# Patient Record
Sex: MALE | URBAN - METROPOLITAN AREA
[De-identification: names, ages, dates, MRNs, and addresses within clinical notes are randomized per-mention and may not be internally consistent; named-entity substitution may affect disease eponyms.]

---

## 2017-06-01 ENCOUNTER — EMERGENCY (EMERGENCY)
Facility: HOSPITAL | Age: 62
LOS: 1 days | Discharge: HOME | End: 2017-06-01
Attending: EMERGENCY MEDICINE | Admitting: EMERGENCY MEDICINE
Payer: MEDICAID

## 2017-06-01 VITALS
TEMPERATURE: 98 F | RESPIRATION RATE: 17 BRPM | SYSTOLIC BLOOD PRESSURE: 167 MMHG | HEART RATE: 58 BPM | OXYGEN SATURATION: 100 % | DIASTOLIC BLOOD PRESSURE: 81 MMHG

## 2017-06-01 VITALS
HEART RATE: 48 BPM | RESPIRATION RATE: 18 BRPM | OXYGEN SATURATION: 100 % | SYSTOLIC BLOOD PRESSURE: 155 MMHG | DIASTOLIC BLOOD PRESSURE: 80 MMHG

## 2017-06-01 DIAGNOSIS — Z85.46 PERSONAL HISTORY OF MALIGNANT NEOPLASM OF PROSTATE: Chronic | ICD-10-CM

## 2017-06-01 LAB
ALBUMIN SERPL ELPH-MCNC: 4.3 G/DL — SIGNIFICANT CHANGE UP (ref 3.3–5)
ALP SERPL-CCNC: 51 U/L — SIGNIFICANT CHANGE UP (ref 40–120)
ALT FLD-CCNC: 24 U/L RC — SIGNIFICANT CHANGE UP (ref 10–45)
ANION GAP SERPL CALC-SCNC: 9 MMOL/L — SIGNIFICANT CHANGE UP (ref 5–17)
AST SERPL-CCNC: 23 U/L — SIGNIFICANT CHANGE UP (ref 10–40)
BILIRUB SERPL-MCNC: 0.6 MG/DL — SIGNIFICANT CHANGE UP (ref 0.2–1.2)
BUN SERPL-MCNC: 18 MG/DL — SIGNIFICANT CHANGE UP (ref 7–23)
CALCIUM SERPL-MCNC: 9.6 MG/DL — SIGNIFICANT CHANGE UP (ref 8.4–10.5)
CHLORIDE SERPL-SCNC: 105 MMOL/L — SIGNIFICANT CHANGE UP (ref 96–108)
CO2 SERPL-SCNC: 27 MMOL/L — SIGNIFICANT CHANGE UP (ref 22–31)
CREAT SERPL-MCNC: 0.94 MG/DL — SIGNIFICANT CHANGE UP (ref 0.5–1.3)
CRP SERPL-MCNC: 0.2 MG/DL — SIGNIFICANT CHANGE UP (ref 0–0.4)
ERYTHROCYTE [SEDIMENTATION RATE] IN BLOOD: 10 MM/HR — SIGNIFICANT CHANGE UP (ref 0–20)
GLUCOSE SERPL-MCNC: 102 MG/DL — HIGH (ref 70–99)
HCT VFR BLD CALC: 39.8 % — SIGNIFICANT CHANGE UP (ref 39–50)
HGB BLD-MCNC: 13.3 G/DL — SIGNIFICANT CHANGE UP (ref 13–17)
MCHC RBC-ENTMCNC: 32.3 PG — SIGNIFICANT CHANGE UP (ref 27–34)
MCHC RBC-ENTMCNC: 33.4 GM/DL — SIGNIFICANT CHANGE UP (ref 32–36)
MCV RBC AUTO: 96.7 FL — SIGNIFICANT CHANGE UP (ref 80–100)
PLATELET # BLD AUTO: 196 K/UL — SIGNIFICANT CHANGE UP (ref 150–400)
POTASSIUM SERPL-MCNC: 4.9 MMOL/L — SIGNIFICANT CHANGE UP (ref 3.5–5.3)
POTASSIUM SERPL-SCNC: 4.9 MMOL/L — SIGNIFICANT CHANGE UP (ref 3.5–5.3)
PROT SERPL-MCNC: 7.2 G/DL — SIGNIFICANT CHANGE UP (ref 6–8.3)
RBC # BLD: 4.12 M/UL — LOW (ref 4.2–5.8)
RBC # FLD: 11.7 % — SIGNIFICANT CHANGE UP (ref 10.3–14.5)
SODIUM SERPL-SCNC: 141 MMOL/L — SIGNIFICANT CHANGE UP (ref 135–145)
WBC # BLD: 5.8 K/UL — SIGNIFICANT CHANGE UP (ref 3.8–10.5)
WBC # FLD AUTO: 5.8 K/UL — SIGNIFICANT CHANGE UP (ref 3.8–10.5)

## 2017-06-01 PROCEDURE — 99284 EMERGENCY DEPT VISIT MOD MDM: CPT

## 2017-06-01 PROCEDURE — 85652 RBC SED RATE AUTOMATED: CPT

## 2017-06-01 PROCEDURE — 86140 C-REACTIVE PROTEIN: CPT

## 2017-06-01 PROCEDURE — 85027 COMPLETE CBC AUTOMATED: CPT

## 2017-06-01 PROCEDURE — 73564 X-RAY EXAM KNEE 4 OR MORE: CPT

## 2017-06-01 PROCEDURE — 73564 X-RAY EXAM KNEE 4 OR MORE: CPT | Mod: 26,LT

## 2017-06-01 PROCEDURE — 80053 COMPREHEN METABOLIC PANEL: CPT

## 2017-06-01 PROCEDURE — 99283 EMERGENCY DEPT VISIT LOW MDM: CPT | Mod: 25

## 2017-06-01 RX ORDER — OXYCODONE HYDROCHLORIDE 5 MG/1
1 TABLET ORAL
Qty: 9 | Refills: 0 | OUTPATIENT
Start: 2017-06-01 | End: 2017-06-04

## 2017-06-01 RX ORDER — OXYCODONE HYDROCHLORIDE 5 MG/1
5 TABLET ORAL ONCE
Qty: 0 | Refills: 0 | Status: DISCONTINUED | OUTPATIENT
Start: 2017-06-01 | End: 2017-06-01

## 2017-06-01 RX ORDER — ACETAMINOPHEN 500 MG
650 TABLET ORAL ONCE
Qty: 0 | Refills: 0 | Status: COMPLETED | OUTPATIENT
Start: 2017-06-01 | End: 2017-06-01

## 2017-06-01 RX ADMIN — OXYCODONE HYDROCHLORIDE 5 MILLIGRAM(S): 5 TABLET ORAL at 13:00

## 2017-06-01 RX ADMIN — Medication 650 MILLIGRAM(S): at 13:00

## 2017-06-01 RX ADMIN — OXYCODONE HYDROCHLORIDE 5 MILLIGRAM(S): 5 TABLET ORAL at 12:34

## 2017-06-01 RX ADMIN — Medication 650 MILLIGRAM(S): at 12:34

## 2017-06-01 NOTE — ED PROVIDER NOTE - CARE PLAN
Principal Discharge DX:	Chronic pain of left knee  Instructions for follow-up, activity and diet:	You were seen in the Emergency Department for knee pain. Your examination and lab tests were reassuring. Please follow up with orthopedic surgery as soon as possible for further evaluation. Please return to the Emergency Department if you have any new concerning symptoms such as severe pain, weakness, or any other concerning symptoms. Take tylenol and ibuprofen as needed for pain. Please return to the Emergency Department if you have any new concerning symptoms such as severe pain, weakness, or any other concerning symptoms.

## 2017-06-01 NOTE — ED ADULT NURSE NOTE - OBJECTIVE STATEMENT
[id number] 62 yr old BM c/o left knee pain x 3 months and on and off. Injury many yrs ago. diff walking today. No recent injury. +Swelling left knee. pain with ROM. ambulated in from waiting room. A&Ox3. Denies fever or chills

## 2017-06-01 NOTE — ED PROVIDER NOTE - OBJECTIVE STATEMENT
Private Physician [facility]  62y male pmh HTN, SP proctectomy for ca, No chemo/rt. habits,dm,hld, pt complains of pain left knee pain for past several months. Now diff ambulating. No trauma, No fever chills. sob/chest pain/palps.calf pain/swelling. Took naprosyn without relief. Now diff ambulating. No rash swelling. Pain worse with flexion, ambulation. Private Physician [facility]  62y male pmh HTN, SP proctectomy for ca, No chemo/rt. habits,dm,hld, pt complains of pain left knee pain for past several months. Now diff ambulating. No trauma, No fever chills. sob/chest pain/palps.calf pain/swelling. Took naprosyn without relief. Now diff ambulating. No rash swelling. Pain worse with flexion, ambulation. Pain is medial left knee, aggravated by movement, mildly alleviated by medications, pressure. Denies trauma.

## 2017-06-01 NOTE — ED PROVIDER NOTE - PLAN OF CARE
You were seen in the Emergency Department for knee pain. Your examination and lab tests were reassuring. Please follow up with orthopedic surgery as soon as possible for further evaluation. Please return to the Emergency Department if you have any new concerning symptoms such as severe pain, weakness, or any other concerning symptoms. Take tylenol and ibuprofen as needed for pain. Please return to the Emergency Department if you have any new concerning symptoms such as severe pain, weakness, or any other concerning symptoms.

## 2017-06-06 ENCOUNTER — APPOINTMENT (OUTPATIENT)
Age: 62
End: 2017-06-06

## 2017-06-06 DIAGNOSIS — M23.92 UNSPECIFIED INTERNAL DERANGEMENT OF LEFT KNEE: ICD-10-CM

## 2017-06-06 PROBLEM — Z00.00 ENCOUNTER FOR PREVENTIVE HEALTH EXAMINATION: Noted: 2017-06-06

## 2017-07-11 ENCOUNTER — APPOINTMENT (OUTPATIENT)
Age: 62
End: 2017-07-11
Payer: MEDICAID

## 2017-07-11 PROCEDURE — 99213 OFFICE O/P EST LOW 20 MIN: CPT | Mod: 25

## 2017-07-11 PROCEDURE — 29530 STRAPPING OF KNEE: CPT | Mod: LT

## 2017-08-01 ENCOUNTER — APPOINTMENT (OUTPATIENT)
Age: 62
End: 2017-08-01
Payer: MEDICAID

## 2017-08-01 DIAGNOSIS — M17.10 UNILATERAL PRIMARY OSTEOARTHRITIS, UNSPECIFIED KNEE: ICD-10-CM

## 2017-08-01 PROCEDURE — 99213 OFFICE O/P EST LOW 20 MIN: CPT | Mod: 25

## 2017-09-05 ENCOUNTER — APPOINTMENT (OUTPATIENT)
Age: 62
End: 2017-09-05